# Patient Record
Sex: MALE | Race: WHITE
[De-identification: names, ages, dates, MRNs, and addresses within clinical notes are randomized per-mention and may not be internally consistent; named-entity substitution may affect disease eponyms.]

---

## 2019-08-24 ENCOUNTER — HOSPITAL ENCOUNTER (EMERGENCY)
Dept: HOSPITAL 38 - CC.ED | Age: 49
Discharge: HOME | End: 2019-08-24
Payer: SELF-PAY

## 2019-08-24 DIAGNOSIS — S62.637B: Primary | ICD-10-CM

## 2019-08-24 DIAGNOSIS — Z79.899: ICD-10-CM
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
## 2019-08-24 RX ADMIN — NEOMYCIN AND POLYMYXIN B SULFATES AND BACITRACIN ZINC ONE EACH: 400; 3.5; 5 OINTMENT TOPICAL at 10:48

## 2019-08-24 NOTE — EDM.PDOC
ED HPI GENERAL MEDICAL PROBLEM





- General


Chief Complaint: Laceration


Stated Complaint: laceration


Time Seen by Provider: 08/24/19 09:14


Source of Information: Reports: Patient


History Limitations: Reports: No Limitations





- History of Present Illness


INITIAL COMMENTS - FREE TEXT/NARRATIVE: 





Jalen is a 49 year old male who presents ambulatory to the ED with c/o injury to 

his left 5th digit. He reports about 16 hours prior to presentation he got his 

finger smashed between two pieces of metal. He reports he wrapped it with 

electrical tape to stop the bleeding. He reports he was drinking at time of 

injury so did not immediately present for care. He reports this morning he is 

having more pain to his finger. Has a very tight dressing of electrical tape on 

finger. He reports he is able to move his finger without difficulty. Denies any 

other injuries or complaints. 








Onset Date: 08/23/19


Onset Time: 17:30


Duration: Constant


Location: Reports: Upper Extremity, Left (5th digit)


Quality: Reports: Ache, Throbbing


Severity: Moderate


Improves with: Reports: Medication


Associated Symptoms: Reports: No Other Symptoms


Treatments PTA: Reports: NSAIDS


  ** Left Finger-Little


Pain Score (Numeric/FACES): 6





- Related Data


 Allergies











Allergy/AdvReac Type Severity Reaction Status Date / Time


 


No Known Allergies Allergy   Verified 08/24/19 09:13











Home Meds: 


 Home Meds





Sulfamethoxazole/Trimethoprim [Bactrim Ds Tablet] 1 each PO BID 10 Days #20 

tablet 08/24/19 [Rx]











ED ROS GENERAL





- Review of Systems


Review Of Systems: ROS reveals no pertinent complaints other than HPI.





ED EXAM, SKIN/RASH


Exam: See Below


Exam Limited By: No Limitations


General Appearance: Alert, WD/WN, No Apparent Distress


Cardiovascular: Normal Peripheral Pulses


Peripheral Pulses: 2+: Radial (L)


Extremities: Normal Range of Motion, Normal Capillary Refill, Other (4 cm 

laceration with irregular edges to left 5th digit, ROM intact to DIP, bleeding 

controlled)





ED SKIN PROCEDURES





- Laceration/Wound Repair


  ** Left Distal Digit - 5th (Baby)


Appearance: Subcutaneous, Irregular, Mildly Contaminated


Distal NVT: Neuro & Vascular Intact, No Tendon Injury


Anesthetic Type: Digital


Local Anesthesia - Lidocaine (Xylocaine): 1% Plain


Local Anesthetic Volume: 5cc


Skin Prep: Chlorhexidine (Hibiciens), Providone-Iodine (Betadine), Saline


Saline Irrigation (cc's): 30


Exploration/Debridement/Repair: Wound Explored, Minimal Debridement, Wound 

Margins Revised


Closed with: Sutures


Lac/Wound length In cm: 4


Suture Size: 4-0


# of Sutures: 6


Suture Type: Nylon, Interrupted, Simple


Sterile Dressing Applied: Nurse


Tetanus Status Addressed: Yes


Complications: No


Progress/Comments: 





Wound was closed ~17 hours after time of injury. Discussed significantly 

increase risk of infection and delayed wound closure/healing due to delay in 

seeking care. 











Course





- Vital Signs


Last Recorded V/S: 


 Last Vital Signs











Temp  97.4 F   08/24/19 09:09


 


Pulse  79   08/24/19 09:09


 


Resp  16   08/24/19 09:09


 


BP  132/82   08/24/19 09:09


 


Pulse Ox  99   08/24/19 09:09














- Orders/Labs/Meds


Meds: 


Medications














Discontinued Medications














Generic Name Dose Route Start Last Admin





  Trade Name Freq  PRN Reason Stop Dose Admin


 


Lidocaine HCl  20 ml  08/24/19 09:36  08/24/19 10:20





  Xylocaine 1%  INJECT  08/24/19 09:37  20 ml





  ONETIME ONE   Administration





     





     





     





     


 


Neomycin/Polymyxin/Bacitracin  1 each  08/24/19 10:34  08/24/19 10:48





  Triple Antibiotic Oint  TOP  08/24/19 10:35  1 each





  ONETIME ONE   Administration





     





     





     





     














- Re-Assessments/Exams


Free Text/Narrative Re-Assessment/Exam: 





Discussed with patient that due to the fact he did not present for closure 

until 16 hours following injury, risk of nonadherence of tissue hindering wound 

closure is likely as well as significantly increased risk of infection. Patient 

verbalized understanding. 








Free Text/Narrative Re-Assessment/Exam: 








PLEASE SEE NURSES NOTE FOR PMH, PSH, SH, & FH. 











Departure





- Departure


Time of Disposition: 10:48


Disposition: Home, Self-Care 01


Condition: Good


Clinical Impression: 


 Open fracture of distal phalanx of left hand








- Discharge Information


*PRESCRIPTION DRUG MONITORING PROGRAM REVIEWED*: No


*COPY OF PRESCRIPTION DRUG MONITORING REPORT IN PATIENT DEISI: No


Prescriptions: 


Sulfamethoxazole/Trimethoprim [Bactrim Ds Tablet] 1 each PO BID 10 Days #20 

tablet


Instructions:  Finger Fracture, Adult, Easy-to-Read, Laceration Care, Adult, 

Easy-to-Read


Referrals: 


PCP,None [Primary Care Provider] - 


Forms:  ED Department Discharge


Additional Instructions: 


- Bactrim DS twice daily x 10 days. Script can be picked up at [facility]


- [place] (hydrocodone-acetaminophen) 1 tablet every 4 hours as needed for severe 

pain


- For less severe pain alternate Tylenol and ibuprofen or Aleve every 4-6 hours 

as needed for pain


- Keep area clean and dry


- Keep covered when working in dirty work environment


- Apply Neosporin and change dressing daily


- Follow up for suture removal 10 days


- Follow up sooner if any concerns


- Monitor for infection: redness, drainage, increased pain etc.